# Patient Record
Sex: FEMALE | Race: WHITE | NOT HISPANIC OR LATINO | Employment: UNEMPLOYED | RURAL
[De-identification: names, ages, dates, MRNs, and addresses within clinical notes are randomized per-mention and may not be internally consistent; named-entity substitution may affect disease eponyms.]

---

## 2017-02-02 ENCOUNTER — OFFICE VISIT (OUTPATIENT)
Dept: RETAIL CLINIC | Facility: CLINIC | Age: 27
End: 2017-02-02

## 2017-02-02 VITALS
TEMPERATURE: 97.2 F | OXYGEN SATURATION: 96 % | DIASTOLIC BLOOD PRESSURE: 68 MMHG | SYSTOLIC BLOOD PRESSURE: 128 MMHG | BODY MASS INDEX: 26.66 KG/M2 | HEART RATE: 104 BPM | WEIGHT: 160 LBS | HEIGHT: 65 IN

## 2017-02-02 DIAGNOSIS — J20.9 ACUTE BRONCHITIS, UNSPECIFIED ORGANISM: Primary | ICD-10-CM

## 2017-02-02 DIAGNOSIS — Z34.90 PREGNANCY, UNSPECIFIED GESTATIONAL AGE: ICD-10-CM

## 2017-02-02 DIAGNOSIS — J06.9 ACUTE URI: ICD-10-CM

## 2017-02-02 PROCEDURE — 99213 OFFICE O/P EST LOW 20 MIN: CPT | Performed by: NURSE PRACTITIONER

## 2017-02-02 RX ORDER — AMOXICILLIN 500 MG/1
500 CAPSULE ORAL 3 TIMES DAILY
Qty: 30 CAPSULE | Refills: 0 | Status: SHIPPED | OUTPATIENT
Start: 2017-02-02 | End: 2017-02-10 | Stop reason: HOSPADM

## 2017-02-02 RX ORDER — DIPHENHYDRAMINE HCL 25 MG
TABLET ORAL
Qty: 20 TABLET | Refills: 0 | Status: SHIPPED | OUTPATIENT
Start: 2017-02-02 | End: 2017-02-10 | Stop reason: HOSPADM

## 2017-02-02 RX ORDER — GUAIFENESIN/DEXTROMETHORPHAN 100-10MG/5
10 SYRUP ORAL 4 TIMES DAILY PRN
Qty: 240 ML | Refills: 0 | Status: SHIPPED | OUTPATIENT
Start: 2017-02-02 | End: 2018-05-10 | Stop reason: HOSPADM

## 2017-02-02 NOTE — PATIENT INSTRUCTIONS
Acute Bronchitis  Bronchitis is inflammation of the airways that extend from the windpipe into the lungs (bronchi). The inflammation often causes mucus to develop. This leads to a cough, which is the most common symptom of bronchitis.   In acute bronchitis, the condition usually develops suddenly and goes away over time, usually in a couple weeks. Smoking, allergies, and asthma can make bronchitis worse. Repeated episodes of bronchitis may cause further lung problems.   CAUSES  Acute bronchitis is most often caused by the same virus that causes a cold. The virus can spread from person to person (contagious) through coughing, sneezing, and touching contaminated objects.  SIGNS AND SYMPTOMS   · Cough.    · Fever.    · Coughing up mucus.    · Body aches.    · Chest congestion.    · Chills.    · Shortness of breath.    · Sore throat.    DIAGNOSIS   Acute bronchitis is usually diagnosed through a physical exam. Your health care provider will also ask you questions about your medical history. Tests, such as chest X-rays, are sometimes done to rule out other conditions.   TREATMENT   Acute bronchitis usually goes away in a couple weeks. Oftentimes, no medical treatment is necessary. Medicines are sometimes given for relief of fever or cough. Antibiotic medicines are usually not needed but may be prescribed in certain situations. In some cases, an inhaler may be recommended to help reduce shortness of breath and control the cough. A cool mist vaporizer may also be used to help thin bronchial secretions and make it easier to clear the chest.   HOME CARE INSTRUCTIONS  · Get plenty of rest.    · Drink enough fluids to keep your urine clear or pale yellow (unless you have a medical condition that requires fluid restriction). Increasing fluids may help thin your respiratory secretions (sputum) and reduce chest congestion, and it will prevent dehydration.    · Take medicines only as directed by your health care provider.  · If  "you were prescribed an antibiotic medicine, finish it all even if you start to feel better.  · Avoid smoking and secondhand smoke. Exposure to cigarette smoke or irritating chemicals will make bronchitis worse. If you are a smoker, consider using nicotine gum or skin patches to help control withdrawal symptoms. Quitting smoking will help your lungs heal faster.    · Reduce the chances of another bout of acute bronchitis by washing your hands frequently, avoiding people with cold symptoms, and trying not to touch your hands to your mouth, nose, or eyes.    · Keep all follow-up visits as directed by your health care provider.    SEEK MEDICAL CARE IF:  Your symptoms do not improve after 1 week of treatment.   SEEK IMMEDIATE MEDICAL CARE IF:  · You develop an increased fever or chills.    · You have chest pain.    · You have severe shortness of breath.  · You have bloody sputum.    · You develop dehydration.  · You faint or repeatedly feel like you are going to pass out.  · You develop repeated vomiting.  · You develop a severe headache.  MAKE SURE YOU:   · Understand these instructions.  · Will watch your condition.  · Will get help right away if you are not doing well or get worse.     This information is not intended to replace advice given to you by your health care provider. Make sure you discuss any questions you have with your health care provider.     Document Released: 01/25/2006 Document Revised: 01/08/2016 Document Reviewed: 06/10/2014  [employer] Interactive Patient Education ©2016 [employer]  Upper Respiratory Infection, Adult  Most upper respiratory infections (URIs) are a viral infection of the air passages leading to the lungs. A URI affects the nose, throat, and upper air passages. The most common type of URI is nasopharyngitis and is typically referred to as \"the common cold.\"  URIs run their course and usually go away on their own. Most of the time, a URI does not require medical attention, but " sometimes a bacterial infection in the upper airways can follow a viral infection. This is called a secondary infection. Sinus and middle ear infections are common types of secondary upper respiratory infections.  Bacterial pneumonia can also complicate a URI. A URI can worsen asthma and chronic obstructive pulmonary disease (COPD). Sometimes, these complications can require emergency medical care and may be life threatening.   CAUSES  Almost all URIs are caused by viruses. A virus is a type of germ and can spread from one person to another.   RISKS FACTORS  You may be at risk for a URI if:   · You smoke.    · You have chronic heart or lung disease.  · You have a weakened defense (immune) system.    · You are very young or very old.    · You have nasal allergies or asthma.  · You work in crowded or poorly ventilated areas.  · You work in health care facilities or schools.  SIGNS AND SYMPTOMS   Symptoms typically develop 2-3 days after you come in contact with a cold virus. Most viral URIs last 7-10 days. However, viral URIs from the influenza virus (flu virus) can last 14-18 days and are typically more severe. Symptoms may include:   · Runny or stuffy (congested) nose.    · Sneezing.    · Cough.    · Sore throat.    · Headache.    · Fatigue.    · Fever.    · Loss of appetite.    · Pain in your forehead, behind your eyes, and over your cheekbones (sinus pain).  · Muscle aches.    DIAGNOSIS   Your health care provider may diagnose a URI by:  · Physical exam.  · Tests to check that your symptoms are not due to another condition such as:  ¨ Strep throat.  ¨ Sinusitis.  ¨ Pneumonia.  ¨ Asthma.  TREATMENT   A URI goes away on its own with time. It cannot be cured with medicines, but medicines may be prescribed or recommended to relieve symptoms. Medicines may help:  · Reduce your fever.  · Reduce your cough.  · Relieve nasal congestion.  HOME CARE INSTRUCTIONS   · Take medicines only as directed by your health care  provider.    · Gargle warm saltwater or take cough drops to comfort your throat as directed by your health care provider.  · Use a warm mist humidifier or inhale steam from a shower to increase air moisture. This may make it easier to breathe.  · Drink enough fluid to keep your urine clear or pale yellow.    · Eat soups and other clear broths and maintain good nutrition.    · Rest as needed.    · Return to work when your temperature has returned to normal or as your health care provider advises. You may need to stay home longer to avoid infecting others. You can also use a face mask and careful hand washing to prevent spread of the virus.  · Increase the usage of your inhaler if you have asthma.    · Do not use any tobacco products, including cigarettes, chewing tobacco, or electronic cigarettes. If you need help quitting, ask your health care provider.  PREVENTION   The best way to protect yourself from getting a cold is to practice good hygiene.   · Avoid oral or hand contact with people with cold symptoms.    · Wash your hands often if contact occurs.    There is no clear evidence that vitamin C, vitamin E, echinacea, or exercise reduces the chance of developing a cold. However, it is always recommended to get plenty of rest, exercise, and practice good nutrition.   SEEK MEDICAL CARE IF:   · You are getting worse rather than better.    · Your symptoms are not controlled by medicine.    · You have chills.  · You have worsening shortness of breath.  · You have brown or red mucus.  · You have yellow or brown nasal discharge.  · You have pain in your face, especially when you bend forward.  · You have a fever.  · You have swollen neck glands.  · You have pain while swallowing.  · You have white areas in the back of your throat.  SEEK IMMEDIATE MEDICAL CARE IF:   · You have severe or persistent:    Headache.    Ear pain.    Sinus pain.    Chest pain.  · You have chronic lung disease and any of the following:     Wheezing.    Prolonged cough.    Coughing up blood.    A change in your usual mucus.  · You have a stiff neck.  · You have changes in your:    Vision.    Hearing.    Thinking.    Mood.  MAKE SURE YOU:   · Understand these instructions.  · Will watch your condition.  · Will get help right away if you are not doing well or get worse.     This information is not intended to replace advice given to you by your health care provider. Make sure you discuss any questions you have with your health care provider.     Document Released: 06/13/2002 Document Revised: 05/03/2016 Document Reviewed: 03/25/2015  [employer] Interactive Patient Education ©2016 [employer]

## 2017-02-02 NOTE — PROGRESS NOTES
Subjective   Magalis Dexter is a 26 y.o. female.     HPI Comments: Daughter has similar symptoms and seen at ER earlier in week and had negative strep and flu test and dx with URI.      Patient is 9 months pregnant and due next week.  OB is in [place] and told her she could take Amoxil.      URI    This is a new problem. Episode onset: x 2-3 days. The problem has been gradually worsening. Maximum temperature: low grade on/off. Associated symptoms include congestion, coughing, headaches, rhinorrhea, sinus pain, a sore throat ( scratchy) and wheezing. Pertinent negatives include no abdominal pain, chest pain, diarrhea, dysuria, ear pain, joint pain, joint swelling, nausea, neck pain, plugged ear sensation, rash, sneezing, swollen glands or vomiting. She has tried acetaminophen for the symptoms. The treatment provided no relief.        The following portions of the patient's history were reviewed and updated as appropriate: allergies, current medications, past medical history and past social history.    Review of Systems   Constitutional: Positive for chills and fever. Negative for activity change and appetite change.   HENT: Positive for congestion, rhinorrhea, sinus pressure and sore throat ( scratchy). Negative for ear discharge, ear pain, postnasal drip, sneezing and trouble swallowing.    Eyes: Negative.    Respiratory: Positive for cough, chest tightness and wheezing.    Cardiovascular: Negative for chest pain, palpitations and leg swelling.   Gastrointestinal: Negative for abdominal pain, diarrhea, nausea and vomiting.   Genitourinary: Negative for dysuria.   Musculoskeletal: Negative for joint pain, myalgias, neck pain and neck stiffness.   Skin: Negative.  Negative for rash.   Neurological: Positive for headaches. Negative for dizziness.   Hematological: Negative for adenopathy.   Psychiatric/Behavioral: Negative.        Objective    Visit Vitals   • /68 (BP Location: Left arm, Patient Position:  "Sitting, Cuff Size: Adult)   • Pulse 104   • Temp 97.2 °F (36.2 °C) (Tympanic)   • Ht 65\" (165.1 cm)   • Wt 160 lb (72.6 kg)   • LMP 05/23/2016   • SpO2 96%   • BMI 26.63 kg/m2       Physical Exam   Constitutional: She is oriented to person, place, and time. She appears well-developed. Distressed:  does not appear to feel well.   HENT:   Head: Normocephalic and atraumatic.   Right Ear: Tympanic membrane and ear canal normal.   Left Ear: Tympanic membrane and ear canal normal.   Nose: Mucosal edema (injected, very stuffed) and rhinorrhea ( purulent) present.   Mouth/Throat: Uvula is midline and mucous membranes are normal. Posterior oropharyngeal erythema ( injected with PND) present.   Generalized TTP over all the sinuses, no localized pain     Eyes: Conjunctivae are normal.   Neck: Normal range of motion. Neck supple.   Cardiovascular: Normal rate and regular rhythm.    Pulmonary/Chest: Effort normal. She has wheezes.   Tight, wheezy, congested cough     Lymphadenopathy:     She has cervical adenopathy ( shotty).   Neurological: She is alert and oriented to person, place, and time.   Psychiatric: She has a normal mood and affect. Her behavior is normal.   Nursing note and vitals reviewed.      Assessment/Plan   Magalis was seen today for uri.    Diagnoses and all orders for this visit:    Acute URI  -     amoxicillin (AMOXIL) 500 MG capsule; Take 1 capsule by mouth 3 (Three) Times a Day.  -     diphenhydrAMINE (BENADRYL) 25 MG tablet; 1-2 caps po QHS prn drainage, congestion    Acute bronchitis, unspecified organism  -     amoxicillin (AMOXIL) 500 MG capsule; Take 1 capsule by mouth 3 (Three) Times a Day.  -     guaifenesin-dextromethorphan (ROBITUSSIN DM) 100-10 MG/5ML syrup; Take 10 mL by mouth 4 (Four) Times a Day As Needed for cough or congestion.    Push fluids  Rest  Tylenol as needed    Use Albuterol inhaler you have at home as needed for the wheezing    F/U with OB for worsening symptoms/problems.  "

## 2017-02-08 ENCOUNTER — ANESTHESIA (OUTPATIENT)
Dept: LABOR AND DELIVERY | Facility: HOSPITAL | Age: 27
End: 2017-02-08

## 2017-02-08 ENCOUNTER — ANESTHESIA EVENT (OUTPATIENT)
Dept: LABOR AND DELIVERY | Facility: HOSPITAL | Age: 27
End: 2017-02-08

## 2017-02-08 PROBLEM — Z37.9 NORMAL LABOR: Status: ACTIVE | Noted: 2017-02-08

## 2017-02-08 PROBLEM — Z37.9 NORMAL LABOR: Status: RESOLVED | Noted: 2017-02-08 | Resolved: 2017-02-08

## 2017-02-08 PROCEDURE — 25010000002 ROPIVACAINE PER 1 MG: Performed by: NURSE ANESTHETIST, CERTIFIED REGISTERED

## 2017-02-08 PROCEDURE — 25010000002 FENTANYL CITRATE (PF) 250 MCG/5ML SOLUTION: Performed by: NURSE ANESTHETIST, CERTIFIED REGISTERED

## 2017-02-08 PROCEDURE — 25010000002 HYDROMORPHONE PER 4 MG: Performed by: NURSE ANESTHETIST, CERTIFIED REGISTERED

## 2017-02-08 PROCEDURE — 25010000003 CEFAZOLIN PER 500 MG: Performed by: NURSE ANESTHETIST, CERTIFIED REGISTERED

## 2017-02-08 PROCEDURE — C1755 CATHETER, INTRASPINAL: HCPCS | Performed by: NURSE ANESTHETIST, CERTIFIED REGISTERED

## 2017-02-08 PROCEDURE — 25010000002 ONDANSETRON PER 1 MG: Performed by: NURSE ANESTHETIST, CERTIFIED REGISTERED

## 2017-02-08 PROCEDURE — 25010000002 DEXAMETHASONE PER 1 MG: Performed by: NURSE ANESTHETIST, CERTIFIED REGISTERED

## 2017-02-08 RX ORDER — LIDOCAINE HYDROCHLORIDE 20 MG/ML
INJECTION, SOLUTION EPIDURAL; INFILTRATION; INTRACAUDAL; PERINEURAL AS NEEDED
Status: DISCONTINUED | OUTPATIENT
Start: 2017-02-08 | End: 2017-02-09 | Stop reason: SURG

## 2017-02-08 RX ORDER — LIDOCAINE HYDROCHLORIDE AND EPINEPHRINE 15; 5 MG/ML; UG/ML
INJECTION, SOLUTION EPIDURAL AS NEEDED
Status: DISCONTINUED | OUTPATIENT
Start: 2017-02-08 | End: 2017-02-09 | Stop reason: SURG

## 2017-02-08 RX ORDER — FENTANYL CITRATE 50 UG/ML
INJECTION, SOLUTION INTRAMUSCULAR; INTRAVENOUS AS NEEDED
Status: DISCONTINUED | OUTPATIENT
Start: 2017-02-08 | End: 2017-02-09 | Stop reason: SURG

## 2017-02-08 RX ORDER — ROPIVACAINE HYDROCHLORIDE 2 MG/ML
INJECTION, SOLUTION EPIDURAL; INFILTRATION; PERINEURAL AS NEEDED
Status: DISCONTINUED | OUTPATIENT
Start: 2017-02-08 | End: 2017-02-09 | Stop reason: SURG

## 2017-02-08 RX ORDER — DEXAMETHASONE SODIUM PHOSPHATE 4 MG/ML
INJECTION, SOLUTION INTRA-ARTICULAR; INTRALESIONAL; INTRAMUSCULAR; INTRAVENOUS; SOFT TISSUE AS NEEDED
Status: DISCONTINUED | OUTPATIENT
Start: 2017-02-08 | End: 2017-02-09 | Stop reason: SURG

## 2017-02-08 RX ORDER — CEFAZOLIN SODIUM 1 G/3ML
INJECTION, POWDER, FOR SOLUTION INTRAMUSCULAR; INTRAVENOUS AS NEEDED
Status: DISCONTINUED | OUTPATIENT
Start: 2017-02-08 | End: 2017-02-09 | Stop reason: SURG

## 2017-02-08 RX ORDER — ONDANSETRON 2 MG/ML
INJECTION INTRAMUSCULAR; INTRAVENOUS AS NEEDED
Status: DISCONTINUED | OUTPATIENT
Start: 2017-02-08 | End: 2017-02-09 | Stop reason: SURG

## 2017-02-08 RX ADMIN — OXYTOCIN 1 UNITS/MIN: 10 INJECTION INTRAVENOUS at 10:07

## 2017-02-08 RX ADMIN — HYDROMORPHONE HYDROCHLORIDE 1 MG: 1 INJECTION, SOLUTION INTRAMUSCULAR; INTRAVENOUS; SUBCUTANEOUS at 10:04

## 2017-02-08 RX ADMIN — ONDANSETRON HYDROCHLORIDE 4 MG: 2 SOLUTION INTRAMUSCULAR; INTRAVENOUS at 10:08

## 2017-02-08 RX ADMIN — FENTANYL CITRATE 200 MCG: 50 INJECTION INTRAMUSCULAR; INTRAVENOUS at 07:25

## 2017-02-08 RX ADMIN — ROPIVACAINE HYDROCHLORIDE 12 ML/HR: 2 INJECTION, SOLUTION EPIDURAL; INFILTRATION at 07:25

## 2017-02-08 RX ADMIN — FENTANYL CITRATE 50 MCG: 50 INJECTION INTRAMUSCULAR; INTRAVENOUS at 07:19

## 2017-02-08 RX ADMIN — ROPIVACAINE HYDROCHLORIDE 5 ML: 2 INJECTION, SOLUTION EPIDURAL; INFILTRATION at 07:19

## 2017-02-08 RX ADMIN — LIDOCAINE HYDROCHLORIDE 10 ML: 20 INJECTION, SOLUTION EPIDURAL; INFILTRATION; INTRACAUDAL; PERINEURAL at 09:51

## 2017-02-08 RX ADMIN — LIDOCAINE HYDROCHLORIDE,EPINEPHRINE BITARTRATE 3 ML: 15; .005 INJECTION, SOLUTION EPIDURAL; INFILTRATION; INTRACAUDAL; PERINEURAL at 07:15

## 2017-02-08 RX ADMIN — DEXAMETHASONE SODIUM PHOSPHATE 8 MG: 4 INJECTION, SOLUTION INTRAMUSCULAR; INTRAVENOUS at 10:08

## 2017-02-08 RX ADMIN — CEFAZOLIN 2 G: 1 INJECTION, POWDER, FOR SOLUTION INTRAVENOUS at 09:59

## 2017-02-08 RX ADMIN — LIDOCAINE HYDROCHLORIDE 10 ML: 20 INJECTION, SOLUTION EPIDURAL; INFILTRATION; INTRACAUDAL; PERINEURAL at 10:00

## 2017-02-08 RX ADMIN — SODIUM CHLORIDE, POTASSIUM CHLORIDE, SODIUM LACTATE AND CALCIUM CHLORIDE: 600; 310; 30; 20 INJECTION, SOLUTION INTRAVENOUS at 10:07

## 2017-02-08 NOTE — ANESTHESIA PREPROCEDURE EVALUATION
Anesthesia Evaluation     Patient summary reviewed and Nursing notes reviewed   no history of anesthetic complications:     Airway   Mallampati: II  TM distance: >3 FB  Neck ROM: full  no difficulty expected  Dental    (+) poor dentation    Pulmonary    (+) a smoker (1/2ppd) Current,   Cardiovascular - negative cardio ROS    (-) hypertension      Neuro/Psych  (+) psychiatric history Anxiety, Depression and Bipolar,    GI/Hepatic/Renal/Endo - negative ROS     Musculoskeletal (-) negative ROS    Abdominal    Substance History - negative use     OB/GYN    (+) Pregnant,   (-) Preeclampsia and history of pregnancy induced hypertension        Other                                    Anesthesia Plan    ASA 2     epidural     Anesthetic plan and risks discussed with patient.  Use of blood products discussed with patient  Consented to blood products.

## 2017-02-09 NOTE — ANESTHESIA POSTPROCEDURE EVALUATION
Patient: Magalis Dexter    Procedure Summary     Date Anesthesia Start Anesthesia Stop Room / Location    17 0645   [facility] DELIV[facility] 2 /  [facility]       Procedure Diagnosis Surgeon Provider     SECTION PRIMARY (N/A Abdomen) No diagnosis on file. MD Kamran Rodriges CRNA          Anesthesia Type: epidural  Last vitals  BP      Temp      Pulse     Resp      SpO2        Post Anesthesia Care and Evaluation    Patient location during evaluation: floor  Patient participation: complete - patient participated  Level of consciousness: awake  Pain score: 0  Pain management: adequate  Airway patency: patent  Anesthetic complications: No anesthetic complications  PONV Status: none  Cardiovascular status: acceptable  Respiratory status: acceptable  Hydration status: acceptable  Post Neuraxial Block status: Motor and sensory function returned to baseline and No signs or symptoms of PDPH

## 2017-09-25 LAB — EXTERNAL RUBELLA QUALITATIVE: NORMAL

## 2018-04-17 LAB — EXTERNAL GROUP B STREP ANTIGEN: POSITIVE

## 2018-05-07 ENCOUNTER — ANESTHESIA (OUTPATIENT)
Dept: LABOR AND DELIVERY | Facility: HOSPITAL | Age: 28
End: 2018-05-07

## 2018-05-07 ENCOUNTER — HOSPITAL ENCOUNTER (INPATIENT)
Facility: HOSPITAL | Age: 28
LOS: 3 days | Discharge: HOME | End: 2018-05-10
Attending: OBSTETRICS & GYNECOLOGY | Admitting: OBSTETRICS & GYNECOLOGY

## 2018-05-07 ENCOUNTER — ANESTHESIA EVENT (OUTPATIENT)
Dept: LABOR AND DELIVERY | Facility: HOSPITAL | Age: 28
End: 2018-05-07

## 2018-05-07 LAB
ABO GROUP BLD: NORMAL
AMPHET+METHAMPHET UR QL: NEGATIVE
BARBITURATES UR QL SCN: NEGATIVE
BASOPHILS # BLD MANUAL: 0.1 10*3/MM3 (ref 0–0.2)
BASOPHILS NFR BLD AUTO: 1 % (ref 0–2)
BENZODIAZ UR QL SCN: POSITIVE
BLD GP AB SCN SERPL QL: NEGATIVE
CANNABINOIDS SERPL QL: NEGATIVE
COCAINE UR QL: NEGATIVE
DEPRECATED RDW RBC AUTO: 40.5 FL (ref 40–54)
ERYTHROCYTE [DISTWIDTH] IN BLOOD BY AUTOMATED COUNT: 13.1 % (ref 12–15)
GIANT PLATELETS: NORMAL
HCT VFR BLD AUTO: 35.2 % (ref 37–47)
HGB BLD-MCNC: 11.7 G/DL (ref 12–16)
LYMPHOCYTES # BLD MANUAL: 1.95 10*3/MM3 (ref 0.72–4.86)
LYMPHOCYTES NFR BLD MANUAL: 19.4 % (ref 15–45)
LYMPHOCYTES NFR BLD MANUAL: 5.1 % (ref 4–12)
MCH RBC QN AUTO: 28.2 PG (ref 28–32)
MCHC RBC AUTO-ENTMCNC: 33.2 G/DL (ref 33–36)
MCV RBC AUTO: 84.8 FL (ref 82–98)
METHADONE UR QL SCN: NEGATIVE
MONOCYTES # BLD AUTO: 0.51 10*3/MM3 (ref 0.19–1.3)
NEUTROPHILS # BLD AUTO: 7.49 10*3/MM3 (ref 1.87–8.4)
NEUTROPHILS NFR BLD MANUAL: 74.5 % (ref 39–78)
OPIATES UR QL: NEGATIVE
PCP UR QL SCN: NEGATIVE
PLATELET # BLD AUTO: 231 10*3/MM3 (ref 130–400)
PMV BLD AUTO: 11.7 FL (ref 6–12)
RBC # BLD AUTO: 4.15 10*6/MM3 (ref 4.2–5.4)
RBC MORPH BLD: NORMAL
RH BLD: POSITIVE
T&S EXPIRATION DATE: NORMAL
WBC MORPH BLD: NORMAL
WBC NRBC COR # BLD: 10.06 10*3/MM3 (ref 4.8–10.8)

## 2018-05-07 PROCEDURE — 85007 BL SMEAR W/DIFF WBC COUNT: CPT | Performed by: OBSTETRICS & GYNECOLOGY

## 2018-05-07 PROCEDURE — 86900 BLOOD TYPING SEROLOGIC ABO: CPT | Performed by: OBSTETRICS & GYNECOLOGY

## 2018-05-07 PROCEDURE — 86850 RBC ANTIBODY SCREEN: CPT | Performed by: OBSTETRICS & GYNECOLOGY

## 2018-05-07 PROCEDURE — 25010000002 HYDROMORPHONE PER 4 MG

## 2018-05-07 PROCEDURE — 80307 DRUG TEST PRSMV CHEM ANLYZR: CPT | Performed by: OBSTETRICS & GYNECOLOGY

## 2018-05-07 PROCEDURE — 85027 COMPLETE CBC AUTOMATED: CPT | Performed by: OBSTETRICS & GYNECOLOGY

## 2018-05-07 PROCEDURE — C1765 ADHESION BARRIER: HCPCS | Performed by: OBSTETRICS & GYNECOLOGY

## 2018-05-07 PROCEDURE — 51703 INSERT BLADDER CATH COMPLEX: CPT

## 2018-05-07 PROCEDURE — 88305 TISSUE EXAM BY PATHOLOGIST: CPT | Performed by: OBSTETRICS & GYNECOLOGY

## 2018-05-07 PROCEDURE — 25010000003 CEFAZOLIN PER 500 MG: Performed by: NURSE ANESTHETIST, CERTIFIED REGISTERED

## 2018-05-07 PROCEDURE — 86901 BLOOD TYPING SEROLOGIC RH(D): CPT | Performed by: OBSTETRICS & GYNECOLOGY

## 2018-05-07 PROCEDURE — 25010000002 ONDANSETRON PER 1 MG: Performed by: NURSE ANESTHETIST, CERTIFIED REGISTERED

## 2018-05-07 PROCEDURE — 0UL70ZZ OCCLUSION OF BILATERAL FALLOPIAN TUBES, OPEN APPROACH: ICD-10-PCS | Performed by: OBSTETRICS & GYNECOLOGY

## 2018-05-07 PROCEDURE — 25010000002 KETOROLAC TROMETHAMINE PER 15 MG: Performed by: NURSE ANESTHETIST, CERTIFIED REGISTERED

## 2018-05-07 PROCEDURE — 88307 TISSUE EXAM BY PATHOLOGIST: CPT | Performed by: OBSTETRICS & GYNECOLOGY

## 2018-05-07 PROCEDURE — 25010000002 DEXAMETHASONE PER 1 MG: Performed by: NURSE ANESTHETIST, CERTIFIED REGISTERED

## 2018-05-07 RX ORDER — OXYTOCIN/RINGER'S LACTATE 20/1000 ML
125 PLASTIC BAG, INJECTION (ML) INTRAVENOUS AS NEEDED
Status: DISCONTINUED | OUTPATIENT
Start: 2018-05-07 | End: 2018-05-08 | Stop reason: HOSPADM

## 2018-05-07 RX ORDER — CEFAZOLIN SODIUM 1 G/3ML
INJECTION, POWDER, FOR SOLUTION INTRAMUSCULAR; INTRAVENOUS AS NEEDED
Status: DISCONTINUED | OUTPATIENT
Start: 2018-05-07 | End: 2018-05-07 | Stop reason: SURG

## 2018-05-07 RX ORDER — SODIUM CHLORIDE, SODIUM LACTATE, POTASSIUM CHLORIDE, CALCIUM CHLORIDE 600; 310; 30; 20 MG/100ML; MG/100ML; MG/100ML; MG/100ML
125 INJECTION, SOLUTION INTRAVENOUS CONTINUOUS
Status: DISCONTINUED | OUTPATIENT
Start: 2018-05-07 | End: 2018-05-08 | Stop reason: HOSPADM

## 2018-05-07 RX ORDER — PROMETHAZINE HYDROCHLORIDE 25 MG/ML
12.5 INJECTION, SOLUTION INTRAMUSCULAR; INTRAVENOUS EVERY 6 HOURS PRN
Status: DISCONTINUED | OUTPATIENT
Start: 2018-05-07 | End: 2018-05-08 | Stop reason: HOSPADM

## 2018-05-07 RX ORDER — CYCLOBENZAPRINE HCL 5 MG
5 TABLET ORAL 3 TIMES DAILY PRN
COMMUNITY

## 2018-05-07 RX ORDER — PROMETHAZINE HYDROCHLORIDE 25 MG/1
12.5 TABLET ORAL EVERY 6 HOURS PRN
Status: DISCONTINUED | OUTPATIENT
Start: 2018-05-07 | End: 2018-05-08 | Stop reason: HOSPADM

## 2018-05-07 RX ORDER — ACETAMINOPHEN 325 MG/1
650 TABLET ORAL EVERY 4 HOURS PRN
Status: DISCONTINUED | OUTPATIENT
Start: 2018-05-07 | End: 2018-05-08 | Stop reason: HOSPADM

## 2018-05-07 RX ORDER — IBUPROFEN 800 MG/1
800 TABLET ORAL EVERY 8 HOURS PRN
Status: DISCONTINUED | OUTPATIENT
Start: 2018-05-07 | End: 2018-05-08 | Stop reason: HOSPADM

## 2018-05-07 RX ORDER — PENICILLIN G 3000000 [IU]/50ML
3 INJECTION, SOLUTION INTRAVENOUS EVERY 4 HOURS
Status: DISCONTINUED | OUTPATIENT
Start: 2018-05-08 | End: 2018-05-08 | Stop reason: HOSPADM

## 2018-05-07 RX ORDER — KETOROLAC TROMETHAMINE 30 MG/ML
INJECTION, SOLUTION INTRAMUSCULAR; INTRAVENOUS AS NEEDED
Status: DISCONTINUED | OUTPATIENT
Start: 2018-05-07 | End: 2018-05-07 | Stop reason: SURG

## 2018-05-07 RX ORDER — OXYTOCIN/RINGER'S LACTATE 20/1000 ML
999 PLASTIC BAG, INJECTION (ML) INTRAVENOUS ONCE
Status: DISCONTINUED | OUTPATIENT
Start: 2018-05-07 | End: 2018-05-08 | Stop reason: HOSPADM

## 2018-05-07 RX ORDER — PRENATAL VIT NO.126/IRON/FOLIC 28MG-0.8MG
TABLET ORAL DAILY
COMMUNITY

## 2018-05-07 RX ORDER — BUTORPHANOL TARTRATE 1 MG/ML
1 INJECTION, SOLUTION INTRAMUSCULAR; INTRAVENOUS
Status: DISCONTINUED | OUTPATIENT
Start: 2018-05-07 | End: 2018-05-08 | Stop reason: HOSPADM

## 2018-05-07 RX ORDER — ONDANSETRON 4 MG/1
4 TABLET, ORALLY DISINTEGRATING ORAL EVERY 6 HOURS PRN
Status: DISCONTINUED | OUTPATIENT
Start: 2018-05-07 | End: 2018-05-08 | Stop reason: HOSPADM

## 2018-05-07 RX ORDER — SODIUM CHLORIDE 0.9 % (FLUSH) 0.9 %
1-10 SYRINGE (ML) INJECTION AS NEEDED
Status: DISCONTINUED | OUTPATIENT
Start: 2018-05-07 | End: 2018-05-08 | Stop reason: HOSPADM

## 2018-05-07 RX ORDER — METHYLERGONOVINE MALEATE 0.2 MG/ML
200 INJECTION INTRAVENOUS ONCE AS NEEDED
Status: DISCONTINUED | OUTPATIENT
Start: 2018-05-07 | End: 2018-05-08 | Stop reason: HOSPADM

## 2018-05-07 RX ORDER — TERBUTALINE SULFATE 1 MG/ML
0.25 INJECTION, SOLUTION SUBCUTANEOUS AS NEEDED
Status: DISCONTINUED | OUTPATIENT
Start: 2018-05-07 | End: 2018-05-08 | Stop reason: HOSPADM

## 2018-05-07 RX ORDER — FAMOTIDINE 10 MG/ML
20 INJECTION, SOLUTION INTRAVENOUS ONCE AS NEEDED
Status: DISCONTINUED | OUTPATIENT
Start: 2018-05-07 | End: 2018-05-08 | Stop reason: HOSPADM

## 2018-05-07 RX ORDER — MORPHINE SULFATE 2 MG/ML
2 INJECTION, SOLUTION INTRAMUSCULAR; INTRAVENOUS
Status: DISCONTINUED | OUTPATIENT
Start: 2018-05-07 | End: 2018-05-08 | Stop reason: HOSPADM

## 2018-05-07 RX ORDER — TRISODIUM CITRATE DIHYDRATE AND CITRIC ACID MONOHYDRATE 500; 334 MG/5ML; MG/5ML
SOLUTION ORAL
Status: COMPLETED
Start: 2018-05-07 | End: 2018-05-07

## 2018-05-07 RX ORDER — DEXAMETHASONE SODIUM PHOSPHATE 4 MG/ML
INJECTION, SOLUTION INTRA-ARTICULAR; INTRALESIONAL; INTRAMUSCULAR; INTRAVENOUS; SOFT TISSUE AS NEEDED
Status: DISCONTINUED | OUTPATIENT
Start: 2018-05-07 | End: 2018-05-07 | Stop reason: SURG

## 2018-05-07 RX ORDER — ONDANSETRON 2 MG/ML
4 INJECTION INTRAMUSCULAR; INTRAVENOUS EVERY 6 HOURS PRN
Status: DISCONTINUED | OUTPATIENT
Start: 2018-05-07 | End: 2018-05-08 | Stop reason: HOSPADM

## 2018-05-07 RX ORDER — PROMETHAZINE HYDROCHLORIDE 12.5 MG/1
12.5 SUPPOSITORY RECTAL EVERY 6 HOURS PRN
Status: DISCONTINUED | OUTPATIENT
Start: 2018-05-07 | End: 2018-05-08 | Stop reason: HOSPADM

## 2018-05-07 RX ORDER — ONDANSETRON 4 MG/1
4 TABLET, FILM COATED ORAL EVERY 6 HOURS PRN
Status: DISCONTINUED | OUTPATIENT
Start: 2018-05-07 | End: 2018-05-08 | Stop reason: HOSPADM

## 2018-05-07 RX ORDER — MAGNESIUM HYDROXIDE 1200 MG/15ML
LIQUID ORAL AS NEEDED
Status: DISCONTINUED | OUTPATIENT
Start: 2018-05-07 | End: 2018-05-10 | Stop reason: HOSPADM

## 2018-05-07 RX ORDER — LIDOCAINE HYDROCHLORIDE 10 MG/ML
5 INJECTION, SOLUTION EPIDURAL; INFILTRATION; INTRACAUDAL; PERINEURAL AS NEEDED
Status: DISCONTINUED | OUTPATIENT
Start: 2018-05-07 | End: 2018-05-08 | Stop reason: HOSPADM

## 2018-05-07 RX ORDER — MISOPROSTOL 200 UG/1
800 TABLET ORAL AS NEEDED
Status: DISCONTINUED | OUTPATIENT
Start: 2018-05-07 | End: 2018-05-08 | Stop reason: HOSPADM

## 2018-05-07 RX ORDER — PHENYLEPHRINE HCL IN 0.9% NACL 0.8MG/10ML
SYRINGE (ML) INTRAVENOUS AS NEEDED
Status: DISCONTINUED | OUTPATIENT
Start: 2018-05-07 | End: 2018-05-07 | Stop reason: SURG

## 2018-05-07 RX ORDER — ONDANSETRON 2 MG/ML
INJECTION INTRAMUSCULAR; INTRAVENOUS AS NEEDED
Status: DISCONTINUED | OUTPATIENT
Start: 2018-05-07 | End: 2018-05-07 | Stop reason: SURG

## 2018-05-07 RX ORDER — TRISODIUM CITRATE DIHYDRATE AND CITRIC ACID MONOHYDRATE 500; 334 MG/5ML; MG/5ML
15 SOLUTION ORAL ONCE
Status: DISCONTINUED | OUTPATIENT
Start: 2018-05-07 | End: 2018-05-08 | Stop reason: HOSPADM

## 2018-05-07 RX ORDER — PROMETHAZINE HYDROCHLORIDE 12.5 MG/1
12.5 TABLET ORAL EVERY 6 HOURS PRN
COMMUNITY
End: 2018-05-10 | Stop reason: HOSPADM

## 2018-05-07 RX ORDER — CARBOPROST TROMETHAMINE 250 UG/ML
250 INJECTION, SOLUTION INTRAMUSCULAR AS NEEDED
Status: DISCONTINUED | OUTPATIENT
Start: 2018-05-07 | End: 2018-05-08 | Stop reason: HOSPADM

## 2018-05-07 RX ORDER — BUPIVACAINE HYDROCHLORIDE 7.5 MG/ML
INJECTION, SOLUTION EPIDURAL; RETROBULBAR AS NEEDED
Status: DISCONTINUED | OUTPATIENT
Start: 2018-05-07 | End: 2018-05-07 | Stop reason: SURG

## 2018-05-07 RX ADMIN — Medication 160 MCG: at 21:11

## 2018-05-07 RX ADMIN — Medication 160 MCG: at 21:06

## 2018-05-07 RX ADMIN — SODIUM CHLORIDE, POTASSIUM CHLORIDE, SODIUM LACTATE AND CALCIUM CHLORIDE: 600; 310; 30; 20 INJECTION, SOLUTION INTRAVENOUS at 20:51

## 2018-05-07 RX ADMIN — OXYTOCIN 1 UNITS/MIN: 10 INJECTION INTRAVENOUS at 21:18

## 2018-05-07 RX ADMIN — KETOROLAC TROMETHAMINE 30 MG: 30 INJECTION, SOLUTION INTRAMUSCULAR at 21:42

## 2018-05-07 RX ADMIN — SODIUM CITRATE AND CITRIC ACID MONOHYDRATE 15 ML: 500; 334 SOLUTION ORAL at 20:43

## 2018-05-07 RX ADMIN — Medication 160 MCG: at 21:23

## 2018-05-07 RX ADMIN — SODIUM CHLORIDE, POTASSIUM CHLORIDE, SODIUM LACTATE AND CALCIUM CHLORIDE: 600; 310; 30; 20 INJECTION, SOLUTION INTRAVENOUS at 21:18

## 2018-05-07 RX ADMIN — BUPIVACAINE HYDROCHLORIDE 1.4 ML: 7.5 INJECTION, SOLUTION EPIDURAL; RETROBULBAR at 21:05

## 2018-05-07 RX ADMIN — Medication 900 MCG: at 21:32

## 2018-05-07 RX ADMIN — ONDANSETRON HYDROCHLORIDE 4 MG: 2 SOLUTION INTRAMUSCULAR; INTRAVENOUS at 21:19

## 2018-05-07 RX ADMIN — CEFAZOLIN 2 G: 1 INJECTION, POWDER, FOR SOLUTION INTRAVENOUS at 21:07

## 2018-05-07 RX ADMIN — Medication 80 MCG: at 21:33

## 2018-05-07 RX ADMIN — KETOROLAC TROMETHAMINE 30 MG: 30 INJECTION, SOLUTION INTRAMUSCULAR at 21:41

## 2018-05-07 RX ADMIN — DEXAMETHASONE SODIUM PHOSPHATE 8 MG: 4 INJECTION, SOLUTION INTRAMUSCULAR; INTRAVENOUS at 21:19

## 2018-05-07 RX ADMIN — Medication 100 MCG: at 21:05

## 2018-05-08 LAB
BASOPHILS # BLD AUTO: 0.03 10*3/MM3 (ref 0–0.2)
BASOPHILS NFR BLD AUTO: 0.2 % (ref 0–2)
DEPRECATED RDW RBC AUTO: 39.7 FL (ref 40–54)
EOSINOPHIL # BLD AUTO: 0 10*3/MM3 (ref 0–0.7)
EOSINOPHIL NFR BLD AUTO: 0 % (ref 0–4)
ERYTHROCYTE [DISTWIDTH] IN BLOOD BY AUTOMATED COUNT: 13 % (ref 12–15)
HCT VFR BLD AUTO: 30.5 % (ref 37–47)
HGB BLD-MCNC: 10.3 G/DL (ref 12–16)
IMM GRANULOCYTES # BLD: 0.1 10*3/MM3 (ref 0–0.03)
IMM GRANULOCYTES NFR BLD: 0.5 % (ref 0–5)
LYMPHOCYTES # BLD AUTO: 1.58 10*3/MM3 (ref 0.72–4.86)
LYMPHOCYTES NFR BLD AUTO: 8.5 % (ref 15–45)
MCH RBC QN AUTO: 28.8 PG (ref 28–32)
MCHC RBC AUTO-ENTMCNC: 33.8 G/DL (ref 33–36)
MCV RBC AUTO: 85.2 FL (ref 82–98)
MONOCYTES # BLD AUTO: 1.24 10*3/MM3 (ref 0.19–1.3)
MONOCYTES NFR BLD AUTO: 6.6 % (ref 4–12)
NEUTROPHILS # BLD AUTO: 15.74 10*3/MM3 (ref 1.87–8.4)
NEUTROPHILS NFR BLD AUTO: 84.2 % (ref 39–78)
NRBC BLD MANUAL-RTO: 0 /100 WBC (ref 0–0)
PLATELET # BLD AUTO: 202 10*3/MM3 (ref 130–400)
PMV BLD AUTO: 12 FL (ref 6–12)
RBC # BLD AUTO: 3.58 10*6/MM3 (ref 4.2–5.4)
WBC NRBC COR # BLD: 18.69 10*3/MM3 (ref 4.8–10.8)

## 2018-05-08 PROCEDURE — 85025 COMPLETE CBC W/AUTO DIFF WBC: CPT | Performed by: OBSTETRICS & GYNECOLOGY

## 2018-05-08 PROCEDURE — 94799 UNLISTED PULMONARY SVC/PX: CPT

## 2018-05-08 PROCEDURE — 25010000002 TDAP 5-2.5-18.5 LF-MCG/0.5 SUSPENSION: Performed by: OBSTETRICS & GYNECOLOGY

## 2018-05-08 PROCEDURE — 25010000002 KETOROLAC TROMETHAMINE PER 15 MG: Performed by: OBSTETRICS & GYNECOLOGY

## 2018-05-08 PROCEDURE — 25010000002 CEFEPIME PER 500 MG: Performed by: OBSTETRICS & GYNECOLOGY

## 2018-05-08 PROCEDURE — 90471 IMMUNIZATION ADMIN: CPT | Performed by: OBSTETRICS & GYNECOLOGY

## 2018-05-08 PROCEDURE — 90715 TDAP VACCINE 7 YRS/> IM: CPT | Performed by: OBSTETRICS & GYNECOLOGY

## 2018-05-08 RX ORDER — SODIUM CHLORIDE 0.9 % (FLUSH) 0.9 %
1-10 SYRINGE (ML) INJECTION AS NEEDED
Status: DISCONTINUED | OUTPATIENT
Start: 2018-05-08 | End: 2018-05-10 | Stop reason: HOSPADM

## 2018-05-08 RX ORDER — IBUPROFEN 800 MG/1
800 TABLET ORAL EVERY 8 HOURS PRN
Status: DISCONTINUED | OUTPATIENT
Start: 2018-05-09 | End: 2018-05-10 | Stop reason: HOSPADM

## 2018-05-08 RX ORDER — KETOROLAC TROMETHAMINE 30 MG/ML
30 INJECTION, SOLUTION INTRAMUSCULAR; INTRAVENOUS EVERY 6 HOURS
Status: COMPLETED | OUTPATIENT
Start: 2018-05-08 | End: 2018-05-08

## 2018-05-08 RX ORDER — PROMETHAZINE HYDROCHLORIDE 25 MG/ML
12.5 INJECTION, SOLUTION INTRAMUSCULAR; INTRAVENOUS EVERY 6 HOURS PRN
Status: DISCONTINUED | OUTPATIENT
Start: 2018-05-08 | End: 2018-05-10 | Stop reason: HOSPADM

## 2018-05-08 RX ORDER — METRONIDAZOLE 500 MG/1
500 TABLET ORAL EVERY 6 HOURS SCHEDULED
Status: COMPLETED | OUTPATIENT
Start: 2018-05-08 | End: 2018-05-08

## 2018-05-08 RX ORDER — CARBOPROST TROMETHAMINE 250 UG/ML
250 INJECTION, SOLUTION INTRAMUSCULAR
Status: ACTIVE | OUTPATIENT
Start: 2018-05-08 | End: 2018-05-09

## 2018-05-08 RX ORDER — PROMETHAZINE HYDROCHLORIDE 12.5 MG/1
12.5 SUPPOSITORY RECTAL EVERY 6 HOURS PRN
Status: DISCONTINUED | OUTPATIENT
Start: 2018-05-08 | End: 2018-05-10 | Stop reason: HOSPADM

## 2018-05-08 RX ORDER — OXYCODONE AND ACETAMINOPHEN 7.5; 325 MG/1; MG/1
1 TABLET ORAL EVERY 4 HOURS PRN
Status: DISCONTINUED | OUTPATIENT
Start: 2018-05-08 | End: 2018-05-08

## 2018-05-08 RX ORDER — BISACODYL 5 MG/1
10 TABLET, DELAYED RELEASE ORAL DAILY PRN
Status: DISCONTINUED | OUTPATIENT
Start: 2018-05-08 | End: 2018-05-10 | Stop reason: HOSPADM

## 2018-05-08 RX ORDER — METHYLERGONOVINE MALEATE 0.2 MG/ML
200 INJECTION INTRAVENOUS
Status: ACTIVE | OUTPATIENT
Start: 2018-05-08 | End: 2018-05-09

## 2018-05-08 RX ORDER — IBUPROFEN 800 MG/1
800 TABLET ORAL EVERY 8 HOURS PRN
Status: DISCONTINUED | OUTPATIENT
Start: 2018-05-08 | End: 2018-05-08 | Stop reason: SDUPTHER

## 2018-05-08 RX ORDER — BUTORPHANOL TARTRATE 1 MG/ML
0.5 INJECTION, SOLUTION INTRAMUSCULAR; INTRAVENOUS EVERY 6 HOURS PRN
Status: DISCONTINUED | OUTPATIENT
Start: 2018-05-08 | End: 2018-05-08

## 2018-05-08 RX ORDER — DOCUSATE SODIUM 100 MG/1
100 CAPSULE, LIQUID FILLED ORAL 2 TIMES DAILY PRN
Status: DISCONTINUED | OUTPATIENT
Start: 2018-05-08 | End: 2018-05-10 | Stop reason: HOSPADM

## 2018-05-08 RX ORDER — BISACODYL 10 MG
10 SUPPOSITORY, RECTAL RECTAL DAILY PRN
Status: DISCONTINUED | OUTPATIENT
Start: 2018-05-08 | End: 2018-05-10 | Stop reason: HOSPADM

## 2018-05-08 RX ORDER — OXYCODONE AND ACETAMINOPHEN 10; 325 MG/1; MG/1
1 TABLET ORAL EVERY 4 HOURS PRN
Status: DISCONTINUED | OUTPATIENT
Start: 2018-05-08 | End: 2018-05-08

## 2018-05-08 RX ORDER — PRENATAL VIT/IRON FUM/FOLIC AC 27MG-0.8MG
1 TABLET ORAL DAILY
Status: DISCONTINUED | OUTPATIENT
Start: 2018-05-08 | End: 2018-05-10 | Stop reason: HOSPADM

## 2018-05-08 RX ORDER — SODIUM CHLORIDE, SODIUM LACTATE, POTASSIUM CHLORIDE, CALCIUM CHLORIDE 600; 310; 30; 20 MG/100ML; MG/100ML; MG/100ML; MG/100ML
125 INJECTION, SOLUTION INTRAVENOUS CONTINUOUS
Status: DISCONTINUED | OUTPATIENT
Start: 2018-05-08 | End: 2018-05-10 | Stop reason: HOSPADM

## 2018-05-08 RX ORDER — MISOPROSTOL 200 UG/1
800 TABLET ORAL ONCE AS NEEDED
Status: ACTIVE | OUTPATIENT
Start: 2018-05-08 | End: 2018-05-09

## 2018-05-08 RX ORDER — NALBUPHINE HCL 10 MG/ML
2.5 AMPUL (ML) INJECTION EVERY 4 HOURS PRN
Status: DISCONTINUED | OUTPATIENT
Start: 2018-05-08 | End: 2018-05-08

## 2018-05-08 RX ORDER — ONDANSETRON 4 MG/1
4 TABLET, FILM COATED ORAL EVERY 6 HOURS PRN
Status: DISCONTINUED | OUTPATIENT
Start: 2018-05-08 | End: 2018-05-10 | Stop reason: HOSPADM

## 2018-05-08 RX ORDER — OXYCODONE HYDROCHLORIDE AND ACETAMINOPHEN 5; 325 MG/1; MG/1
2 TABLET ORAL EVERY 6 HOURS PRN
Status: DISCONTINUED | OUTPATIENT
Start: 2018-05-08 | End: 2018-05-09

## 2018-05-08 RX ORDER — NALOXONE HCL 0.4 MG/ML
0.4 VIAL (ML) INJECTION
Status: DISCONTINUED | OUTPATIENT
Start: 2018-05-08 | End: 2018-05-09

## 2018-05-08 RX ORDER — ONDANSETRON 2 MG/ML
4 INJECTION INTRAMUSCULAR; INTRAVENOUS EVERY 6 HOURS PRN
Status: DISCONTINUED | OUTPATIENT
Start: 2018-05-08 | End: 2018-05-10 | Stop reason: HOSPADM

## 2018-05-08 RX ORDER — IBUPROFEN 200 MG
400 TABLET ORAL EVERY 6 HOURS PRN
Status: DISCONTINUED | OUTPATIENT
Start: 2018-05-08 | End: 2018-05-09

## 2018-05-08 RX ORDER — ONDANSETRON 4 MG/1
4 TABLET, ORALLY DISINTEGRATING ORAL EVERY 6 HOURS PRN
Status: DISCONTINUED | OUTPATIENT
Start: 2018-05-08 | End: 2018-05-10 | Stop reason: HOSPADM

## 2018-05-08 RX ORDER — PROMETHAZINE HYDROCHLORIDE 25 MG/1
25 TABLET ORAL EVERY 6 HOURS PRN
Status: DISCONTINUED | OUTPATIENT
Start: 2018-05-08 | End: 2018-05-10 | Stop reason: HOSPADM

## 2018-05-08 RX ORDER — OXYCODONE HYDROCHLORIDE AND ACETAMINOPHEN 5; 325 MG/1; MG/1
1 TABLET ORAL EVERY 6 HOURS PRN
Status: DISCONTINUED | OUTPATIENT
Start: 2018-05-08 | End: 2018-05-10 | Stop reason: HOSPADM

## 2018-05-08 RX ADMIN — OXYCODONE HYDROCHLORIDE AND ACETAMINOPHEN 2 TABLET: 5; 325 TABLET ORAL at 13:39

## 2018-05-08 RX ADMIN — KETOROLAC TROMETHAMINE 30 MG: 30 INJECTION, SOLUTION INTRAMUSCULAR at 03:55

## 2018-05-08 RX ADMIN — KETOROLAC TROMETHAMINE 30 MG: 30 INJECTION, SOLUTION INTRAMUSCULAR at 21:33

## 2018-05-08 RX ADMIN — KETOROLAC TROMETHAMINE 30 MG: 30 INJECTION, SOLUTION INTRAMUSCULAR at 09:45

## 2018-05-08 RX ADMIN — PRENATAL VIT W/ FE FUMARATE-FA TAB 27-0.8 MG 1 TABLET: 27-0.8 TAB at 08:00

## 2018-05-08 RX ADMIN — CEFEPIME HYDROCHLORIDE 2 G: 2 INJECTION, POWDER, FOR SOLUTION INTRAVENOUS at 02:34

## 2018-05-08 RX ADMIN — TETANUS TOXOID, REDUCED DIPHTHERIA TOXOID AND ACELLULAR PERTUSSIS VACCINE, ADSORBED 0.5 ML: 5; 2.5; 8; 8; 2.5 SUSPENSION INTRAMUSCULAR at 11:01

## 2018-05-08 RX ADMIN — CEFEPIME HYDROCHLORIDE 2 G: 2 INJECTION, POWDER, FOR SOLUTION INTRAVENOUS at 13:39

## 2018-05-08 RX ADMIN — METRONIDAZOLE 500 MG: 500 TABLET ORAL at 13:39

## 2018-05-08 RX ADMIN — OXYCODONE HYDROCHLORIDE AND ACETAMINOPHEN 2 TABLET: 5; 325 TABLET ORAL at 01:00

## 2018-05-08 RX ADMIN — OXYCODONE HYDROCHLORIDE AND ACETAMINOPHEN 2 TABLET: 5; 325 TABLET ORAL at 19:32

## 2018-05-08 RX ADMIN — METRONIDAZOLE 500 MG: 500 TABLET ORAL at 18:00

## 2018-05-08 RX ADMIN — METRONIDAZOLE 500 MG: 500 TABLET ORAL at 02:33

## 2018-05-08 RX ADMIN — METRONIDAZOLE 500 MG: 500 TABLET ORAL at 08:00

## 2018-05-08 RX ADMIN — OXYCODONE HYDROCHLORIDE AND ACETAMINOPHEN 2 TABLET: 5; 325 TABLET ORAL at 08:00

## 2018-05-08 RX ADMIN — KETOROLAC TROMETHAMINE 30 MG: 30 INJECTION, SOLUTION INTRAMUSCULAR at 15:54

## 2018-05-08 NOTE — ANESTHESIA PROCEDURE NOTES
Spinal Block    Patient location during procedure: OB  Performed By  CRNA: ROX LINCOLN  Preanesthetic Checklist  Completed: patient identified, site marked, surgical consent, pre-op evaluation, timeout performed, IV checked, risks and benefits discussed and monitors and equipment checked  Spinal Block Prep:  Patient Position:sitting  Sterile Tech:mask, sterile barriers and gloves  Prep:Chloraprep  Patient Monitoring:blood pressure monitoring, continuous pulse oximetry and EKG  Spinal Block Procedure  Approach:midline  Guidance:landmark technique and palpation technique  Location:L4-L5  Needle Type:Sprotte  Needle Gauge:24 G  Placement of Spinal needle event:cerebrospinal fluid aspirated and No paresthesia, CSF aspirated freely prior to injection and at end of injection  Paresthesia: no  Fluid Appearance:clear  Post Assessment  Patient Tolerance:patient tolerated the procedure well with no apparent complications  Complications no

## 2018-05-08 NOTE — ANESTHESIA POSTPROCEDURE EVALUATION
Patient: Magalis Dexter    Procedure Summary     Date:  18 Room / Location:  [facility] LABOR DELIVERY 1 /  [facility] DELIVERY    Anesthesia Start:   Anesthesia Stop:      Procedure:   SECTION REPEAT WITH  BILATERAL TUBAL LIGATION (Left Abdomen) Diagnosis:  (PREVIOUS  DELIVERY )    Surgeon:  Teresa Manzano MD Provider:  Temitope Espinosa CRNA    Anesthesia Type:  spinal ASA Status:  2          Anesthesia Type: spinal  Last vitals  BP   110/68 (18 1102)   Temp   98 °F (36.7 °C) (18 1102)   Pulse   60 (18 1102)   Resp   16 (18 1102)     SpO2   99 % (18 110)     Post Anesthesia Care and Evaluation    Patient location during evaluation: floor  Patient participation: complete - patient participated  Level of consciousness: awake and alert  Pain management: adequate  Airway patency: patent  Anesthetic complications: No anesthetic complications    Cardiovascular status: acceptable  Respiratory status: acceptable  Hydration status: acceptable  Post Neuraxial Block status: Motor and sensory function returned to baseline and No signs or symptoms of PDPH  Comments: Blood pressure 110/68, pulse 60, temperature 98 °F (36.7 °C), temperature source Temporal Artery , resp. rate 16, SpO2 99 %, unknown if currently breastfeeding.    Pt discharged from PACU based on eliazar score >8

## 2018-05-09 LAB
CYTO UR: NORMAL
CYTO UR: NORMAL
LAB AP CASE REPORT: NORMAL
LAB AP CASE REPORT: NORMAL
LAB AP CLINICAL INFORMATION: NORMAL
LAB AP CLINICAL INFORMATION: NORMAL
Lab: NORMAL
Lab: NORMAL
PATH REPORT.FINAL DX SPEC: NORMAL
PATH REPORT.FINAL DX SPEC: NORMAL
PATH REPORT.GROSS SPEC: NORMAL
PATH REPORT.GROSS SPEC: NORMAL

## 2018-05-09 RX ADMIN — IBUPROFEN 800 MG: 800 TABLET ORAL at 13:49

## 2018-05-09 RX ADMIN — PRENATAL VIT W/ FE FUMARATE-FA TAB 27-0.8 MG 1 TABLET: 27-0.8 TAB at 08:00

## 2018-05-09 RX ADMIN — OXYCODONE HYDROCHLORIDE AND ACETAMINOPHEN 1 TABLET: 5; 325 TABLET ORAL at 13:49

## 2018-05-09 RX ADMIN — IBUPROFEN 800 MG: 800 TABLET ORAL at 22:53

## 2018-05-09 RX ADMIN — IBUPROFEN 800 MG: 800 TABLET ORAL at 05:25

## 2018-05-09 RX ADMIN — OXYCODONE HYDROCHLORIDE AND ACETAMINOPHEN 1 TABLET: 5; 325 TABLET ORAL at 20:12

## 2018-05-09 RX ADMIN — OXYCODONE HYDROCHLORIDE AND ACETAMINOPHEN 2 TABLET: 5; 325 TABLET ORAL at 01:20

## 2018-05-09 RX ADMIN — OXYCODONE HYDROCHLORIDE AND ACETAMINOPHEN 2 TABLET: 5; 325 TABLET ORAL at 07:59

## 2018-05-10 VITALS
DIASTOLIC BLOOD PRESSURE: 89 MMHG | RESPIRATION RATE: 16 BRPM | OXYGEN SATURATION: 97 % | SYSTOLIC BLOOD PRESSURE: 134 MMHG | TEMPERATURE: 97.6 F | HEART RATE: 70 BPM

## 2018-05-10 RX ORDER — OXYCODONE HYDROCHLORIDE AND ACETAMINOPHEN 5; 325 MG/1; MG/1
1 TABLET ORAL EVERY 6 HOURS PRN
Qty: 45 TABLET | Refills: 0 | Status: SHIPPED | OUTPATIENT
Start: 2018-05-10 | End: 2018-05-18

## 2018-05-10 RX ORDER — IBUPROFEN 800 MG/1
800 TABLET ORAL EVERY 8 HOURS PRN
Qty: 90 TABLET | Refills: 2 | Status: SHIPPED | OUTPATIENT
Start: 2018-05-10

## 2018-05-10 RX ADMIN — OXYCODONE HYDROCHLORIDE AND ACETAMINOPHEN 1 TABLET: 5; 325 TABLET ORAL at 06:25

## 2018-05-10 RX ADMIN — OXYCODONE HYDROCHLORIDE AND ACETAMINOPHEN 1 TABLET: 5; 325 TABLET ORAL at 12:15

## 2018-05-10 RX ADMIN — IBUPROFEN 800 MG: 800 TABLET ORAL at 06:25

## 2020-04-28 NOTE — ANESTHESIA PROCEDURE NOTES
Labor Epidural    Patient location during procedure: OB  Start Time: 2/8/2017 6:45 AM  Stop Time: 2/8/2017 7:25 AM  Performed By  CRNA: ROX LINCOLN  Preanesthetic Checklist  Completed: patient identified, site marked, surgical consent, pre-op evaluation, timeout performed, IV checked, risks and benefits discussed and monitors and equipment checked  Epidural Block Prep:  Pt Position:sitting  Sterile Tech:gloves and sterile barrier  Monitoring:blood pressure monitoring and continuous pulse oximetry  Epidural Block Procedure:  Approach:midline  Guidance:landmark technique and palpation technique  Location:L3-L4  Needle Type:Tuohy  Needle Gauge:18 G  Loss of Resistance: 6cm  Cath Depth at skin:12 (Catheter passed without resistance or paresthesia) cm  Paresthesia: none  Aspiration:negative  Test Dose:negative (3ml)  Post Assessment:  Dressing:occlusive dressing applied and secured with tape  Pt Tolerance:patient tolerated the procedure well with no apparent complications  Complications:no             Pt more awake. Oral airway removed.  Radha Simental

## (undated) DEVICE — SUT GUT PLN 0 CT3 27IN N864H

## (undated) DEVICE — 3M™ STERI-STRIP™ REINFORCED ADHESIVE SKIN CLOSURES, R1547, 1/2 IN X 4 IN (12 MM X 100 MM), 6 STRIPS/ENVELOPE: Brand: 3M™ STERI-STRIP™

## (undated) DEVICE — SUT VIC 0 CT1 36IN J946H

## (undated) DEVICE — SLV SCD KN ADJ EXPRSS LG

## (undated) DEVICE — GLV SURG TRIUMPH ORTHO W/ALOE PF LTX 7.0

## (undated) DEVICE — ADHS LIQ MASTISOL 2/3ML

## (undated) DEVICE — DRSNG BRDR MEPILEX P/OP SIL 4X12IN

## (undated) DEVICE — APPL CHLORAPREP W/TINT 26ML ORNG

## (undated) DEVICE — SOL NACL 0.9PCT 1000ML